# Patient Record
Sex: MALE
[De-identification: names, ages, dates, MRNs, and addresses within clinical notes are randomized per-mention and may not be internally consistent; named-entity substitution may affect disease eponyms.]

---

## 2018-02-08 ENCOUNTER — HOSPITAL ENCOUNTER (OUTPATIENT)
Dept: HOSPITAL 92 - BICULT | Age: 68
Discharge: HOME | End: 2018-02-08
Attending: FAMILY MEDICINE
Payer: COMMERCIAL

## 2018-02-08 DIAGNOSIS — K76.0: ICD-10-CM

## 2018-02-08 DIAGNOSIS — J98.4: ICD-10-CM

## 2018-02-08 DIAGNOSIS — R10.32: ICD-10-CM

## 2018-02-08 DIAGNOSIS — I10: Primary | ICD-10-CM

## 2018-02-08 DIAGNOSIS — K76.89: ICD-10-CM

## 2018-02-08 PROCEDURE — 76700 US EXAM ABDOM COMPLETE: CPT

## 2018-02-08 PROCEDURE — 71046 X-RAY EXAM CHEST 2 VIEWS: CPT

## 2018-02-13 ENCOUNTER — HOSPITAL ENCOUNTER (OUTPATIENT)
Dept: HOSPITAL 92 - SDC | Age: 68
Discharge: HOME | End: 2018-02-13
Attending: INTERNAL MEDICINE
Payer: MEDICARE

## 2018-02-13 VITALS — BODY MASS INDEX: 27.1 KG/M2

## 2018-02-13 DIAGNOSIS — K44.9: ICD-10-CM

## 2018-02-13 DIAGNOSIS — F32.9: ICD-10-CM

## 2018-02-13 DIAGNOSIS — E78.5: ICD-10-CM

## 2018-02-13 DIAGNOSIS — K29.50: Primary | ICD-10-CM

## 2018-02-13 DIAGNOSIS — Z88.0: ICD-10-CM

## 2018-02-13 DIAGNOSIS — K64.9: ICD-10-CM

## 2018-02-13 DIAGNOSIS — G89.29: ICD-10-CM

## 2018-02-13 DIAGNOSIS — Z80.0: ICD-10-CM

## 2018-02-13 DIAGNOSIS — I10: ICD-10-CM

## 2018-02-13 DIAGNOSIS — K21.9: ICD-10-CM

## 2018-02-13 DIAGNOSIS — F41.9: ICD-10-CM

## 2018-02-13 PROCEDURE — 88305 TISSUE EXAM BY PATHOLOGIST: CPT

## 2018-02-13 PROCEDURE — 88312 SPECIAL STAINS GROUP 1: CPT

## 2018-02-13 PROCEDURE — 0DB68ZX EXCISION OF STOMACH, VIA NATURAL OR ARTIFICIAL OPENING ENDOSCOPIC, DIAGNOSTIC: ICD-10-PCS | Performed by: INTERNAL MEDICINE

## 2018-02-13 PROCEDURE — 0D758ZZ DILATION OF ESOPHAGUS, VIA NATURAL OR ARTIFICIAL OPENING ENDOSCOPIC: ICD-10-PCS | Performed by: INTERNAL MEDICINE

## 2018-02-13 PROCEDURE — 0DJD8ZZ INSPECTION OF LOWER INTESTINAL TRACT, VIA NATURAL OR ARTIFICIAL OPENING ENDOSCOPIC: ICD-10-PCS | Performed by: INTERNAL MEDICINE

## 2018-02-13 NOTE — OP
DATE OF PROCEDURE:  02/13/2018

 

OPERATIVE PROCEDURE:  Colonoscopy.

 

PREOPERATIVE DIAGNOSIS:  A 67-year-old  male with strong family history of colon cancer
.  The patient had two family members of colon cancer.  The patient had abdominal pain over the left 
lower quadrant.  The patient is undergoing colonoscopy.

 

POSTOPERATIVE DIAGNOSES:  Hemorrhoids.  Otherwise, normal colonoscopy.  The patient did have some ret
ained fecal material, which has been washed out.

 

PROCEDURE IN DETAIL:  The patient was placed on his left lateral position and was given sedation by A
nesthesia Department.  A rectal exam was done before the scope was advanced into the rectum.  No lesi
ons were felt on rectal exam.  A Pentax video colonoscope was introduced into the rectum and advanced
 all the way into the cecum.  The patient had retained fecal material throughout the colon.  Water wa
s used to irrigate and wash out.  The mucosa appeared normal throughout the colon.  The appendiceal o
rifice, ileocecal valve, and cecum, no pathology seen.  Withdrawal of scope from the cecum, ascending
 colon and hepatic flexure, no pathology seen.  The transverse colon, splenic flexure, descending col
on, and sigmoid colon, no lesions seen.  Rectum showed hemorrhoids.

## 2018-02-13 NOTE — OP
DATE OF PROCEDURE:  02/13/2018

 

OPERATIVE PROCEDURES:

1.  Esophagogastroduodenoscopy with biopsy.

2.  Esophageal dilation with a 50-[place] Maurer dilator.

 

PREOPERATIVE DIAGNOSIS:  Dysphagia.

 

POSTOPERATIVE DIAGNOSES:

1.  No esophageal stricture seen.

2.  Hiatus hernia.

3.  Diffuse gastritis.

 

PROCEDURE IN DETAIL:  The patient was placed on his left lateral position and was given sedation by A
nesthesia Department.  A Pentax video gastroscope under direct vision was passed down the oropharynx,
 past the gastroesophageal junction, into the stomach and subsequently into the descending duodenum. 
 The vocal cords appeared healthy.  Although the patient complains of dysphagia, at endoscopy, no eso
phageal narrowing seen.  The patient had a moderate size of hiatus hernia.  Retroflexion failed to sh
ow any lesions in the fundus or cardia.  The gastric body and antrum shows diffuse gastritis.  Biopsi
es were obtained from the gastric antrum and gastric body.  The duodenal bulb and descending duodenum
, no pathology seen.  The stomach was decompressed and the scope removed.  Because of history of dysp
hagia, a 50-[place] Maurer dilator was passed down with no resistance.

 

DISCHARGE PLANNING:  This is a 67-year-old male who came in for an EGD because of the severe dysphagi
a, abdominal pain, and strong family history of colon cancer.  The EGD showed gastritis and hiatus he
rnia.  The colonoscopy was basically negative.

 

DISCHARGE RECOMMENDATIONS:

1.  The patient advised to call me if he develops abdominal pain, hematochezia, or fever.

2.  In the absence of any of the symptoms, the patient will come back to me in 2 weeks.

## 2018-02-13 NOTE — HP
DATE OF ADMISSION:  02/13/2018

 

HISTORY OF PRESENT ILLNESS:  This is a 67-year-old male referred to me by Dr. Hernandez for evaluation 
of dysphagia and abdominal pain.  The patient has dysphagia to solid foods over the last several week
s.  He also complains of abdominal pain.  The patient _____.  The pain started on 01/29/2018 and was 
localized.  He has no fever, no nausea, no vomiting.  He has no other relevant symptoms.  His bowel m
ovements are fairly regular.  The patient was treated with ciprofloxacin by Dr. Hernandez and his sympt
oms are markedly improved.  The patient comes in for a colonoscopy and EGD, because of abdominal pain
 and dysphagia.

 

ALLERGIES:  PENICILLIN.

 

SOCIAL HISTORY:  Quit smoking and drinking alcohol in the past.

 

MEDICAL ILLNESS:

1.  Hypertension.

2.  Hyperlipidemia.

3.  Depression, anxiety.

4.  Chronic back pain.

5.  Acid reflux.

 

PHYSICAL EXAMINATION:

VITAL SIGNS:  Pulse is 70, blood pressure 130/80.

HEENT:  Conjunctivae clear.

LUNGS:  Within normal limits.

ABDOMEN:  Soft to palpate.  Abdomen is tender over the left lower quadrant.  There is no rebound or g
uarding.  Bowel sounds are normal.

 

ADMITTING DIAGNOSES:

1.  Dysphagia.

2.  Abdominal pain.

 

FAMILY HISTORY:  Colon cancer.  Two of family members have colon cancer.

 

PLAN:  EGD and colonoscopy.

## 2018-02-28 ENCOUNTER — HOSPITAL ENCOUNTER (OUTPATIENT)
Dept: HOSPITAL 92 - RAD | Age: 68
Discharge: HOME | End: 2018-02-28
Attending: INTERNAL MEDICINE
Payer: MEDICARE

## 2018-02-28 DIAGNOSIS — R13.10: Primary | ICD-10-CM

## 2018-02-28 PROCEDURE — 74220 X-RAY XM ESOPHAGUS 1CNTRST: CPT

## 2018-02-28 NOTE — RAD
ESOPHOGRAM:

 

Date: 2-28-18 

 

History: Dysphagia. Patient has difficulty swallowing. 

 

 chest x-ray compared to 6-28-09. 

 

FINDINGS: 

Metallic like foreign bodies again overlie the posterior aspect of the mid and upper chest. Cardiac s
ilhouette and pulmonary vasculature are within normal limits. Linear densities are seen at the lung b
ase which may be related to mildly prominent interstitial lung changes. There is persistent blunting 
of the left lateral costophrenic angle likely related to pleural and parenchymal scarring given stabi
lity since prior exam. Lungs are otherwise clear. Vascular calcifications are seen in the thoracic ao
rta. 

 

ESOPHOGRAM:

A double contrast esophogram was performed in the usual fashion. Patient demonstrates normal esophage
al peristalsis. No tertiary contracts were demonstrated during the exam. Esophagus demonstrates a nor
mal appearance without evidence of a mucosal irregularity. No focal area of persistent narrowing is s
een. A 12.5 mm barium tablet was administered during the exam which traverses the GE junction without
 holdup. There is no evidence of a hiatal hernia. 

 

IMPRESSION: 

1. Normal esophogram without focal narrowing or mucosal irregularity seen in the esophagus. A 0.5 mm 
barium tablet was administered during the exam which traverses the esophagus freely and without holdu
p.

 

POS: [facility]

## 2018-12-07 ENCOUNTER — HOSPITAL ENCOUNTER (OUTPATIENT)
Dept: HOSPITAL 92 - BICULT | Age: 68
Discharge: HOME | End: 2018-12-07
Attending: FAMILY MEDICINE
Payer: MEDICARE

## 2018-12-07 DIAGNOSIS — M79.605: Primary | ICD-10-CM

## 2018-12-07 DIAGNOSIS — I50.9: ICD-10-CM

## 2018-12-07 DIAGNOSIS — M47.816: ICD-10-CM

## 2018-12-07 DIAGNOSIS — R10.32: ICD-10-CM

## 2018-12-07 DIAGNOSIS — R31.9: ICD-10-CM

## 2018-12-07 DIAGNOSIS — N13.30: ICD-10-CM

## 2018-12-07 DIAGNOSIS — I11.0: ICD-10-CM

## 2018-12-07 PROCEDURE — 72170 X-RAY EXAM OF PELVIS: CPT

## 2018-12-07 PROCEDURE — 72100 X-RAY EXAM L-S SPINE 2/3 VWS: CPT

## 2018-12-07 PROCEDURE — 76770 US EXAM ABDO BACK WALL COMP: CPT

## 2018-12-07 PROCEDURE — 74018 RADEX ABDOMEN 1 VIEW: CPT

## 2018-12-07 NOTE — ULT
RENAL ULTRASOUND:

12/7/18

 

HISTORY: 

Hematuria. 

 

COMPARISON:  

None. 

 

TECHNIQUE:  

Sagittal and transverse imaging of the kidneys performed. 

 

FINDINGS:  

 

RIGHT KIDNEY:

Normal cortical echotexture. No hydronephrosis. Right kidney measures 5.3 x 5.1 x 10.4 cm. 

 

LEFT KIDNEY:

There is mild hydronephrosis with dilatation of the pelvis and lower pole collecting system. Left kid
onel measures 7.7 x 11.3 x 8.2 cm. Cortex has a normal echotexture. 

 

The urinary bladder is unremarkable with a 107 mL volume. 

 

IMPRESSION:  

Mild left sided hydronephrosis. 

 

 

POS: [facility]

## 2020-08-03 ENCOUNTER — HOSPITAL ENCOUNTER (OUTPATIENT)
Dept: HOSPITAL 92 - BICRAD | Age: 70
Discharge: HOME | End: 2020-08-03
Attending: FAMILY MEDICINE
Payer: MEDICARE

## 2020-08-03 DIAGNOSIS — S89.91XA: Primary | ICD-10-CM

## 2020-08-03 DIAGNOSIS — M25.561: ICD-10-CM

## 2020-08-03 DIAGNOSIS — M17.11: ICD-10-CM

## 2020-08-03 NOTE — RAD
Exam:

XR Knee Rt 4 View STANDARD



HISTORY:

Right knee pain. Patient fell 3 weeks ago. Patient states anterior knee pain.



COMPARISON:

None



FINDINGS:

Osteophytes are seen involving the medial joint compartment. There is minimal medial joint space narr
owing.

No acute fracture, dislocation, or other acute osseous abnormality is identified.



IMPRESSION:



1. No acute osseous abnormality right knee.

2. Mild osteoarthritis involving the medial joint compartment.



Reported By: Aaron Prajapati 

Electronically Signed:  8/3/2020 2:52 PM

## 2021-04-08 ENCOUNTER — HOSPITAL ENCOUNTER (OUTPATIENT)
Dept: HOSPITAL 92 - CSHLAB | Age: 71
Discharge: HOME | End: 2021-04-08
Attending: OTOLARYNGOLOGY
Payer: MEDICARE

## 2021-04-08 DIAGNOSIS — J34.2: ICD-10-CM

## 2021-04-08 DIAGNOSIS — Z01.812: Primary | ICD-10-CM

## 2021-04-08 DIAGNOSIS — Z20.822: ICD-10-CM

## 2021-04-08 DIAGNOSIS — J34.3: ICD-10-CM

## 2021-04-08 DIAGNOSIS — J34.89: ICD-10-CM

## 2021-04-08 LAB
ANION GAP SERPL CALC-SCNC: 12 MMOL/L (ref 10–20)
BUN SERPL-MCNC: 14 MG/DL (ref 8.4–25.7)
CALCIUM SERPL-MCNC: 9.1 MG/DL (ref 7.8–10.44)
CHLORIDE SERPL-SCNC: 103 MMOL/L (ref 98–107)
CO2 SERPL-SCNC: 31 MMOL/L (ref 23–31)
CREAT CL PREDICTED SERPL C-G-VRATE: 0 ML/MIN (ref 70–130)
GLUCOSE SERPL-MCNC: 114 MG/DL (ref 83–110)
HGB BLD-MCNC: 14 G/DL (ref 13.5–17.5)
POTASSIUM SERPL-SCNC: 4.9 MMOL/L (ref 3.5–5.1)
SODIUM SERPL-SCNC: 141 MMOL/L (ref 136–145)

## 2021-04-08 PROCEDURE — 85018 HEMOGLOBIN: CPT

## 2021-04-08 PROCEDURE — 87635 SARS-COV-2 COVID-19 AMP PRB: CPT

## 2021-04-08 PROCEDURE — 93005 ELECTROCARDIOGRAM TRACING: CPT

## 2021-04-08 PROCEDURE — U0005 INFEC AGEN DETEC AMPLI PROBE: HCPCS

## 2021-04-08 PROCEDURE — U0003 INFECTIOUS AGENT DETECTION BY NUCLEIC ACID (DNA OR RNA); SEVERE ACUTE RESPIRATORY SYNDROME CORONAVIRUS 2 (SARS-COV-2) (CORONAVIRUS DISEASE [COVID-19]), AMPLIFIED PROBE TECHNIQUE, MAKING USE OF HIGH THROUGHPUT TECHNOLOGIES AS DESCRIBED BY CMS-2020-01-R: HCPCS

## 2021-04-08 PROCEDURE — 93010 ELECTROCARDIOGRAM REPORT: CPT

## 2021-04-08 PROCEDURE — 85014 HEMATOCRIT: CPT

## 2021-04-08 PROCEDURE — 80048 BASIC METABOLIC PNL TOTAL CA: CPT

## 2021-04-13 ENCOUNTER — HOSPITAL ENCOUNTER (OUTPATIENT)
Dept: HOSPITAL 92 - CSHSDC | Age: 71
Discharge: HOME | End: 2021-04-13
Attending: OTOLARYNGOLOGY
Payer: MEDICARE

## 2021-04-13 VITALS — BODY MASS INDEX: 29.9 KG/M2

## 2021-04-13 DIAGNOSIS — J34.2: Primary | ICD-10-CM

## 2021-04-13 DIAGNOSIS — J34.89: ICD-10-CM

## 2021-04-13 DIAGNOSIS — J34.3: ICD-10-CM
